# Patient Record
Sex: FEMALE | Race: ASIAN | NOT HISPANIC OR LATINO
[De-identification: names, ages, dates, MRNs, and addresses within clinical notes are randomized per-mention and may not be internally consistent; named-entity substitution may affect disease eponyms.]

---

## 2023-08-01 PROBLEM — Z00.00 ENCOUNTER FOR PREVENTIVE HEALTH EXAMINATION: Status: ACTIVE | Noted: 2023-08-01

## 2023-08-07 ENCOUNTER — NON-APPOINTMENT (OUTPATIENT)
Age: 43
End: 2023-08-07

## 2023-08-07 ENCOUNTER — APPOINTMENT (OUTPATIENT)
Dept: SURGICAL ONCOLOGY | Facility: CLINIC | Age: 43
End: 2023-08-07
Payer: MEDICAID

## 2023-08-07 VITALS
HEIGHT: 67 IN | RESPIRATION RATE: 16 BRPM | DIASTOLIC BLOOD PRESSURE: 84 MMHG | WEIGHT: 215 LBS | OXYGEN SATURATION: 98 % | SYSTOLIC BLOOD PRESSURE: 123 MMHG | HEART RATE: 77 BPM | BODY MASS INDEX: 33.74 KG/M2

## 2023-08-07 DIAGNOSIS — Z78.9 OTHER SPECIFIED HEALTH STATUS: ICD-10-CM

## 2023-08-07 PROCEDURE — 99245 OFF/OP CONSLTJ NEW/EST HI 55: CPT

## 2023-08-07 NOTE — ASSESSMENT
[FreeTextEntry1] : I discussed with the patient that papillary breast lesions were previously always recommended to allow histologic evaluation of the entire lesion. Prior studies estimated a 5 to 8% incidence of upgrading of these lesions, most commonly to either atypical hyperplasia or DCIS. However, more recent studies looking at pure IDP without atypia have shown lower rates of upgrade of 1.7%. Therefore, the decision to excise should be based on size, symptoms, or breast cancer risk factors.   Preoperative, intraoperative, and postoperative considerations including preoperative localization by Radiology of this nonpalpable mass, intraoperative anesthetic management, and what she can expect in postoperative period were reviewed.   Risks, benefits, and alternatives to proposed surgical procedure were reviewed and all questions were answered to her satisfaction.  Plan: - Left breast excisional biopsy with MagSeed localization (1 site)

## 2023-08-07 NOTE — CONSULT LETTER
[Dear  ___] : Dear  [unfilled], [Consult Letter:] : I had the pleasure of evaluating your patient, [unfilled]. [Please see my note below.] : Please see my note below. [Sincerely,] : Sincerely, [FreeTextEntry3] : Huong Morocho MD Breast Surgeon Division of Surgical Oncology Department of Surgery [address] [place] [address]  Tel: [phone] Fax: [phone] Email: [email or web address]

## 2023-08-07 NOTE — DATA REVIEWED
[FreeTextEntry1] : 7/27/22 SM  9/12/2022 B/L US 6/5/2023 L US 6/15/23 B/L DM 6/15/23 L USG-CNB 8/4/2023 L DM

## 2023-08-07 NOTE — PHYSICAL EXAM
[Normocephalic] : normocephalic [Atraumatic] : atraumatic [EOMI] : extra ocular movement intact [PERRL] : pupils equal, round and reactive to light [Sclera nonicteric] : sclera nonicteric [Supple] : supple [No Supraclavicular Adenopathy] : no supraclavicular adenopathy [Examined in the supine and seated position] : examined in the supine and seated position [Bra Size: ___] : Bra Size: [unfilled] [Grade 1] : Ptosis Grade 1 [No dominant masses] : no dominant masses in right breast  [No dominant masses] : no dominant masses left breast [No Nipple Retraction] : no left nipple retraction [No Nipple Discharge] : no left nipple discharge [Breast Mass Right Breast ___cm] : no masses [Breast Mass Left Breast ___cm] : no masses [Breast Nipple Inversion] : nipples not inverted [Breast Nipple Retraction] : nipples not retracted [Breast Nipple Flattening] : nipples not flattened [Breast Nipple Fissures] : nipples not fissured [Breast Abnormal Lactation (Galactorrhea)] : no galactorrhea [Breast Abnormal Secretion Bloody Fluid] : no bloody discharge [Breast Abnormal Secretion Serous Fluid] : no serous discharge [Breast Abnormal Secretion Opalescent Fluid] : no milky discharge [No Axillary Lymphadenopathy] : no left axillary lymphadenopathy [No Edema] : no edema [No Rashes] : no rashes [No Ulceration] : no ulceration [de-identified] : non-labored respirations

## 2023-08-07 NOTE — HISTORY OF PRESENT ILLNESS
[FreeTextEntry1] : 42 year-old female presents for an initial consultation, referred by Dr. Meri Pruitt for evaluation of for evaluation of newly diagnosed left breast intraductal papilloma.  She is accompanied by her daughters Byron and Viri.  The patient reports that she started mammograms last year.  She was found to have a probably benign appearing mass recommended for short-term follow-up.  She denies any breast complaints.  Denies any masses, skin changes, or nipple discharge.  Most recent imagin22 SM (R) revealed heterogeneously dense breasts -Negative -BR2  2022 B/L US ([facility])- for supplemental screening purposes -R: neg -L: 9:00 N1 hypoechoic mass, 7 mm, probably benign--> f/u L US in 6 months -BR3  2023 L US (R) -9:00 N1 1.1 cm hypoechoic mass increased in size from prior--> recc USG-CNB -BR4  6/15/23 B/L DM (R) revealed heterogeneously dense breasts -R: neg -L: clustered microcalcifications 9:00 anterior-->rec left breast spot magnification views.  Pt scheduled for L USG-CNB -BR4  6/15/23 L USG-CNB (R) -L 9:00 N1 (cork): Intraductal papilloma.  Calcifications are present in breast tissue ***Cluster of microcalcifications seen on pre-biopsy mammogram which may correlate with the biopsy site, however, postbiopsy mammogram could not be obtained as machine was down for maintenance.  Diagnostic mammogram should be performed prior to surgical procedure.  2023 L DM ([facility]) heterogeneously dense breasts   - Subtle loose cluster of primarily spherical probably benign calcifications at the area of concern anterior third portion of the breast at 9:00 -> f/u DM 6 months  - BR3  PMH: Hyperlipidemia PSH: Hysterectomy for fibroids  Meds: Denies ALL: Denies SH: No tobacco.  No EtOH FH: No family history of breast cancer or other cancers GYN: Menarche 14.  Surgical menopause 40.  .  Age of first full-term pregnancy 26.  Breast-feeding x1.5 years.  OCP none fertility none HRT none

## 2023-08-07 NOTE — PAST MEDICAL HISTORY
[Surgical Menopause] : The patient is in surgical menopause [Menarche Age ____] : age at menarche was [unfilled] [Menopause Age____] : age at menopause was [unfilled] [History of Hormone Replacement Treatment] : has no history of hormone replacement treatment [Total Preg ___] : G[unfilled] [Live Births ___] : P[unfilled]  [Age At Live Birth ___] : Age at live birth: [unfilled] [FreeTextEntry6] : none [FreeTextEntry7] : none [FreeTextEntry8] : 1.5 years

## 2023-08-16 ENCOUNTER — OUTPATIENT (OUTPATIENT)
Dept: OUTPATIENT SERVICES | Facility: HOSPITAL | Age: 43
LOS: 1 days | End: 2023-08-16

## 2023-08-16 VITALS
HEIGHT: 67 IN | HEART RATE: 82 BPM | WEIGHT: 216.05 LBS | OXYGEN SATURATION: 97 % | TEMPERATURE: 99 F | RESPIRATION RATE: 14 BRPM | SYSTOLIC BLOOD PRESSURE: 114 MMHG | DIASTOLIC BLOOD PRESSURE: 83 MMHG

## 2023-08-16 DIAGNOSIS — Z90.710 ACQUIRED ABSENCE OF BOTH CERVIX AND UTERUS: Chronic | ICD-10-CM

## 2023-08-16 DIAGNOSIS — D24.2 BENIGN NEOPLASM OF LEFT BREAST: ICD-10-CM

## 2023-08-16 LAB
ALBUMIN SERPL ELPH-MCNC: 4.3 G/DL — SIGNIFICANT CHANGE UP (ref 3.3–5)
ALP SERPL-CCNC: 69 U/L — SIGNIFICANT CHANGE UP (ref 40–120)
ALT FLD-CCNC: 16 U/L — SIGNIFICANT CHANGE UP (ref 4–33)
ANION GAP SERPL CALC-SCNC: 12 MMOL/L — SIGNIFICANT CHANGE UP (ref 7–14)
AST SERPL-CCNC: 14 U/L — SIGNIFICANT CHANGE UP (ref 4–32)
BILIRUB SERPL-MCNC: 0.3 MG/DL — SIGNIFICANT CHANGE UP (ref 0.2–1.2)
BUN SERPL-MCNC: 11 MG/DL — SIGNIFICANT CHANGE UP (ref 7–23)
CALCIUM SERPL-MCNC: 9.5 MG/DL — SIGNIFICANT CHANGE UP (ref 8.4–10.5)
CHLORIDE SERPL-SCNC: 101 MMOL/L — SIGNIFICANT CHANGE UP (ref 98–107)
CO2 SERPL-SCNC: 27 MMOL/L — SIGNIFICANT CHANGE UP (ref 22–31)
CREAT SERPL-MCNC: 0.8 MG/DL — SIGNIFICANT CHANGE UP (ref 0.5–1.3)
EGFR: 94 ML/MIN/1.73M2 — SIGNIFICANT CHANGE UP
GLUCOSE SERPL-MCNC: 80 MG/DL — SIGNIFICANT CHANGE UP (ref 70–99)
HCT VFR BLD CALC: 42.8 % — SIGNIFICANT CHANGE UP (ref 34.5–45)
HGB BLD-MCNC: 13.9 G/DL — SIGNIFICANT CHANGE UP (ref 11.5–15.5)
MCHC RBC-ENTMCNC: 28.1 PG — SIGNIFICANT CHANGE UP (ref 27–34)
MCHC RBC-ENTMCNC: 32.5 GM/DL — SIGNIFICANT CHANGE UP (ref 32–36)
MCV RBC AUTO: 86.5 FL — SIGNIFICANT CHANGE UP (ref 80–100)
NRBC # BLD: 0 /100 WBCS — SIGNIFICANT CHANGE UP (ref 0–0)
NRBC # FLD: 0 K/UL — SIGNIFICANT CHANGE UP (ref 0–0)
PLATELET # BLD AUTO: 299 K/UL — SIGNIFICANT CHANGE UP (ref 150–400)
POTASSIUM SERPL-MCNC: 3.9 MMOL/L — SIGNIFICANT CHANGE UP (ref 3.5–5.3)
POTASSIUM SERPL-SCNC: 3.9 MMOL/L — SIGNIFICANT CHANGE UP (ref 3.5–5.3)
PROT SERPL-MCNC: 7.9 G/DL — SIGNIFICANT CHANGE UP (ref 6–8.3)
RBC # BLD: 4.95 M/UL — SIGNIFICANT CHANGE UP (ref 3.8–5.2)
RBC # FLD: 14 % — SIGNIFICANT CHANGE UP (ref 10.3–14.5)
SODIUM SERPL-SCNC: 140 MMOL/L — SIGNIFICANT CHANGE UP (ref 135–145)
WBC # BLD: 9.24 K/UL — SIGNIFICANT CHANGE UP (ref 3.8–10.5)
WBC # FLD AUTO: 9.24 K/UL — SIGNIFICANT CHANGE UP (ref 3.8–10.5)

## 2023-08-16 NOTE — H&P PST ADULT - GASTROINTESTINAL
negative normal/soft/nontender/nondistended/normal active bowel sounds/no guarding/no rigidity/no organomegaly/no palpable kang/no masses palpable

## 2023-08-16 NOTE — H&P PST ADULT - PROBLEM SELECTOR PLAN 1
Pt scheduled for left breast excisional biopsy with magseed localization on 8/25/2023.  labs done results  pending, Chlorhexidine provided-  written and verbal instructions given, with teach back, pt able to verbalize understanding.   medication day of procedure-  pepcid

## 2023-08-16 NOTE — H&P PST ADULT - HISTORY OF PRESENT ILLNESS
41y/o female scheduled for left breast excisional biopsy with magseed localized  on 8/25/2023.  Pt states, "recent breast US abnormal,  bx left breast intraductal papilloma."

## 2023-08-16 NOTE — H&P PST ADULT - NSICDXPASTMEDICALHX_GEN_ALL_CORE_FT
PAST MEDICAL HISTORY:  Benign neoplasm of left breast      PAST MEDICAL HISTORY:  Benign neoplasm of left breast     Leiomyoma

## 2023-08-18 PROBLEM — D21.9 BENIGN NEOPLASM OF CONNECTIVE AND OTHER SOFT TISSUE, UNSPECIFIED: Chronic | Status: ACTIVE | Noted: 2023-08-16

## 2023-08-18 PROBLEM — D24.2 BENIGN NEOPLASM OF LEFT BREAST: Chronic | Status: ACTIVE | Noted: 2023-08-16

## 2023-08-23 ENCOUNTER — APPOINTMENT (OUTPATIENT)
Dept: MAMMOGRAPHY | Facility: IMAGING CENTER | Age: 43
End: 2023-08-23
Payer: MEDICAID

## 2023-08-23 ENCOUNTER — OUTPATIENT (OUTPATIENT)
Dept: OUTPATIENT SERVICES | Facility: HOSPITAL | Age: 43
LOS: 1 days | End: 2023-08-23
Payer: MEDICAID

## 2023-08-23 DIAGNOSIS — D24.2 BENIGN NEOPLASM OF LEFT BREAST: ICD-10-CM

## 2023-08-23 DIAGNOSIS — Z90.710 ACQUIRED ABSENCE OF BOTH CERVIX AND UTERUS: Chronic | ICD-10-CM

## 2023-08-23 PROCEDURE — 19285 PERQ DEV BREAST 1ST US IMAG: CPT | Mod: LT

## 2023-08-23 PROCEDURE — A4648: CPT

## 2023-08-23 PROCEDURE — 19285 PERQ DEV BREAST 1ST US IMAG: CPT

## 2023-08-24 ENCOUNTER — TRANSCRIPTION ENCOUNTER (OUTPATIENT)
Age: 43
End: 2023-08-24

## 2023-08-25 ENCOUNTER — OUTPATIENT (OUTPATIENT)
Dept: OUTPATIENT SERVICES | Facility: HOSPITAL | Age: 43
LOS: 1 days | Discharge: HOME | End: 2023-08-25
Payer: MEDICAID

## 2023-08-25 ENCOUNTER — RESULT REVIEW (OUTPATIENT)
Age: 43
End: 2023-08-25

## 2023-08-25 ENCOUNTER — APPOINTMENT (OUTPATIENT)
Dept: MAMMOGRAPHY | Facility: IMAGING CENTER | Age: 43
End: 2023-08-25
Payer: MEDICAID

## 2023-08-25 ENCOUNTER — TRANSCRIPTION ENCOUNTER (OUTPATIENT)
Age: 43
End: 2023-08-25

## 2023-08-25 ENCOUNTER — OUTPATIENT (OUTPATIENT)
Dept: OUTPATIENT SERVICES | Facility: HOSPITAL | Age: 43
LOS: 1 days | End: 2023-08-25
Payer: MEDICAID

## 2023-08-25 ENCOUNTER — APPOINTMENT (OUTPATIENT)
Dept: SURGICAL ONCOLOGY | Facility: AMBULATORY SURGERY CENTER | Age: 43
End: 2023-08-25

## 2023-08-25 VITALS
OXYGEN SATURATION: 98 % | WEIGHT: 216.05 LBS | TEMPERATURE: 98 F | HEIGHT: 67 IN | HEART RATE: 89 BPM | RESPIRATION RATE: 18 BRPM | SYSTOLIC BLOOD PRESSURE: 139 MMHG | DIASTOLIC BLOOD PRESSURE: 87 MMHG

## 2023-08-25 VITALS
DIASTOLIC BLOOD PRESSURE: 74 MMHG | HEART RATE: 86 BPM | OXYGEN SATURATION: 99 % | SYSTOLIC BLOOD PRESSURE: 130 MMHG | TEMPERATURE: 98 F | RESPIRATION RATE: 18 BRPM

## 2023-08-25 DIAGNOSIS — Z90.710 ACQUIRED ABSENCE OF BOTH CERVIX AND UTERUS: Chronic | ICD-10-CM

## 2023-08-25 DIAGNOSIS — Z00.8 ENCOUNTER FOR OTHER GENERAL EXAMINATION: ICD-10-CM

## 2023-08-25 DIAGNOSIS — D24.2 BENIGN NEOPLASM OF LEFT BREAST: ICD-10-CM

## 2023-08-25 PROCEDURE — 19125 EXCISION BREAST LESION: CPT | Mod: LT

## 2023-08-25 PROCEDURE — 88307 TISSUE EXAM BY PATHOLOGIST: CPT | Mod: 26

## 2023-08-25 PROCEDURE — 76098 X-RAY EXAM SURGICAL SPECIMEN: CPT | Mod: 26

## 2023-08-25 PROCEDURE — 76098 X-RAY EXAM SURGICAL SPECIMEN: CPT

## 2023-08-25 NOTE — ASU DISCHARGE PLAN (ADULT/PEDIATRIC) - PAIN MANAGEMENT
over the counter tylenol as needed for pain , next dose can be taken at 11pm , alternate with motrin 600mg every 6 hours as needed for pain

## 2023-08-25 NOTE — ASU PREOPERATIVE ASSESSMENT, ADULT ([facility]ARK ONLY) - FALL HARM RISK - UNIVERSAL INTERVENTIONS
Bed in lowest position, wheels locked, appropriate side rails in place/Call bell, personal items and telephone in reach/Instruct patient to call for assistance before getting out of bed or chair/Non-slip footwear when patient is out of bed/[place] to call system/Physically safe environment - no spills, clutter or unnecessary equipment/Purposeful Proactive Rounding/Room/bathroom lighting operational, light cord in reach

## 2023-08-25 NOTE — ASU DISCHARGE PLAN (ADULT/PEDIATRIC) - PATIENT EDUCATION MATERIALS PROVIED
Dr Morocho instruction sheet given please follow all instructions/Provider pre-printed instructions given

## 2023-08-25 NOTE — ASU DISCHARGE PLAN (ADULT/PEDIATRIC) - NS MD DC FALL RISK RISK
For information on Fall & Injury Prevention, visit: [email or web address] OR  [email or web address] OR  https://www.cdc.gov/steadi/patient.html

## 2023-08-25 NOTE — ASU DISCHARGE PLAN (ADULT/PEDIATRIC) - ASU DC SPECIAL INSTRUCTIONSFT
Huong Morocho MD  Surgical Oncology  [address] [place], [place] [id number]  Tel [phone]  Direct [phone]  Fax [phone]    BREAST AND AXILLA SURGERY POST-OP INSTRUCTIONS    1) Medications/pain management  • Restart all of your regular medications unless specifically told otherwise  • It is easier to control pain by taking medications before the pain becomes severe  • We recommend taking the following medications on a regular schedule:  • Tylenol 325mg 1-3 tabs every 6 hours for the first day and/or  • Ibuprofen 200mg 1-3 tabs every 6 hours (with food)    • Ice packs may also help to reduce pain and swelling. They can be used every 3-4 hours for ten minutes  at a time. Warm compresses are not to be used near the incision.    2) Incision and Dressing Care  • Wear your sports bra for the next 3-4 days, it will help minimize postoperative bleeding and swelling. You should even wear the bra to bed at night.  • The outer clear dressing and telfa may be removed in 2 days; you can shower today before removing because the dressing is waterproof, just be sure to pat it dry.  • Under the dressing, your incision has both sutures, steri-strips (small, white, strips of tape), and telfa (a white thin foam like material).  • The sutures will be absorbed by your skin tissue over time.  • Steri-strips will fall off by itself, or will be removed in the office.   • When showering, you may use soap and water. Do not scrub. Do not soak in a bath. Pat dry.  • Note: if you received blue dye for lymph node surgery, you may have blue/green urine. This is normal  and will clear by itself.    3) Diet: Resume your regular diet as tolerated.    4) Activity  • Avoid strenuous activity, heavy lifting (> 15 pounds) and vigorous exercise until seen in follow-up.  • Walking and most daily activity may be resumed the day after surgery.  • Most people return to desk-type work in 1-2 days. This will depend on how your feel and what type of  work you do.    5) Follow-up Care  • Pathology results from your biopsy are usually available within 1 week  • You will receive your results by phone or at your follow-up visit  • If your follow-up appointment was not made prior to your surgery, please call the office to schedule an  appointment for 1-2 weeks after your surgery.    Call the office if:  • Pain is not relieved by medication  • Fever greater than 100F or chills  • Persistent bleeding or drainage from your incision  • Persistent swelling, or increasing redness of around the incision  • Any other questions or concerns

## 2023-09-06 ENCOUNTER — APPOINTMENT (OUTPATIENT)
Dept: SURGICAL ONCOLOGY | Facility: CLINIC | Age: 43
End: 2023-09-06
Payer: MEDICAID

## 2023-09-06 VITALS
OXYGEN SATURATION: 99 % | BODY MASS INDEX: 34.21 KG/M2 | WEIGHT: 218 LBS | DIASTOLIC BLOOD PRESSURE: 85 MMHG | HEIGHT: 67 IN | HEART RATE: 84 BPM | SYSTOLIC BLOOD PRESSURE: 130 MMHG | RESPIRATION RATE: 17 BRPM

## 2023-09-06 PROCEDURE — 99024 POSTOP FOLLOW-UP VISIT: CPT

## 2023-09-06 NOTE — HISTORY OF PRESENT ILLNESS
[FreeTextEntry1] : 42 year-old female presents for an initial consultation, referred by Dr. Meri Pruitt for evaluation of for evaluation of newly diagnosed left breast intraductal papilloma s/p L breast excisional biopsy 2023 here for postop visit  She is not accompanied by her daughters Byron and Viri today.  Previous history: The patient reports that she started mammograms last year.  She was found to have a probably benign appearing mass recommended for short-term follow-up.  She denies any breast complaints.  Denies any masses, skin changes, or nipple discharge.  Most recent imagin22 SM (R) revealed heterogeneously dense breasts -Negative -BR2  2022 B/L US (R)- for supplemental screening purposes -R: neg -L: 9:00 N1 hypoechoic mass, 7 mm, probably benign--> f/u L US in 6 months -BR3  2023 L US (R) -9:00 N1 1.1 cm hypoechoic mass increased in size from prior--> recc USG-CNB -BR4  6/15/23 B/L DM (R) revealed heterogeneously dense breasts -R: neg -L: clustered microcalcifications 9:00 anterior-->rec left breast spot magnification views.  Pt scheduled for L USG-CNB -BR4  6/15/23 L USG-CNB (R) -L 9:00 N1 (cork): Intraductal papilloma.  Calcifications are present in breast tissue ***Cluster of microcalcifications seen on pre-biopsy mammogram which may correlate with the biopsy site, however, postbiopsy mammogram could not be obtained as machine was down for maintenance.  Diagnostic mammogram should be performed prior to surgical procedure.  2023 L DM ([facility]) heterogeneously dense breasts   - Subtle loose cluster of primarily spherical probably benign calcifications at the area of concern anterior third portion of the breast at 9:00 -> f/u DM 6 months  - BR3  PMH: Hyperlipidemia PSH: Hysterectomy for fibroids  Meds: Denies ALL: Denies SH: No tobacco.  No EtOH FH: No family history of breast cancer or other cancers GYN: Menarche 14.  Surgical menopause 40.  .  Age of first full-term pregnancy 26.  Breast-feeding x1.5 years.  OCP none fertility none HRT none  2023 L breast excisional biopsy   - PATH PENDING  Interval history: The patient reports some occasional sharp pain in the left breast along with itching. She takes Tylenol once daily as needed. She continues to wear a sports bra which is more comfortable than her regular bra.

## 2023-09-06 NOTE — CONSULT LETTER
[Dear  ___] : Dear  [unfilled], [Courtesy Letter:] : I had the pleasure of seeing your patient, [unfilled], in my office today. [Please see my note below.] : Please see my note below. [Consult Closing:] : Thank you very much for allowing me to participate in the care of this patient.  If you have any questions, please do not hesitate to contact me. [Sincerely,] : Sincerely, [FreeTextEntry3] : Huong Morocho MD Breast Surgeon Division of Surgical Oncology Department of Surgery [address] [place] [address]  Tel: [phone] Fax: [phone] Email: [email or web address]

## 2023-09-06 NOTE — ASSESSMENT
[FreeTextEntry1] : 42 year-old female presents for an initial consultation, referred by Dr. Meri Pruitt for evaluation of for evaluation of newly diagnosed left breast intraductal papilloma s/p L breast excisional biopsy 9/6/2023 here for postop visit  9/6/2023 L breast excisional biopsy   - PATH PENDING  Exam today shows a well-healing incision without evidence of infection, hematoma, or seroma.  Plan:  - f/u path  - 2/2024 L DM  - RTO after imaging or PRN

## 2023-09-06 NOTE — PHYSICAL EXAM
[Normocephalic] : normocephalic [Atraumatic] : atraumatic [EOMI] : extra ocular movement intact [PERRL] : pupils equal, round and reactive to light [Sclera nonicteric] : sclera nonicteric [Bra Size: ___] : Bra Size: [unfilled] [Grade 1] : Ptosis Grade 1 [No Edema] : no edema [No Rashes] : no rashes [No Ulceration] : no ulceration [de-identified] : non-labored respirations  [de-identified] : medial circumareolar incision c/d/i, no erythema, no seroma

## 2023-09-07 LAB — SURGICAL PATHOLOGY STUDY: SIGNIFICANT CHANGE UP

## 2024-07-01 ENCOUNTER — APPOINTMENT (OUTPATIENT)
Dept: SURGICAL ONCOLOGY | Facility: CLINIC | Age: 44
End: 2024-07-01
Payer: MEDICAID

## 2024-07-01 VITALS
HEART RATE: 96 BPM | HEIGHT: 67 IN | SYSTOLIC BLOOD PRESSURE: 120 MMHG | BODY MASS INDEX: 33.9 KG/M2 | DIASTOLIC BLOOD PRESSURE: 80 MMHG | OXYGEN SATURATION: 98 % | WEIGHT: 216 LBS

## 2024-07-01 DIAGNOSIS — D24.2 BENIGN NEOPLASM OF LEFT BREAST: ICD-10-CM

## 2024-07-01 DIAGNOSIS — R92.1 MAMMOGRAPHIC CALCIFICATION FOUND ON DIAGNOSTIC IMAGING OF BREAST: ICD-10-CM

## 2024-07-01 PROCEDURE — 99214 OFFICE O/P EST MOD 30 MIN: CPT

## 2024-08-10 ENCOUNTER — EMERGENCY (EMERGENCY)
Facility: HOSPITAL | Age: 44
LOS: 0 days | Discharge: HOME | End: 2024-08-10
Attending: STUDENT IN AN ORGANIZED HEALTH CARE EDUCATION/TRAINING PROGRAM
Payer: COMMERCIAL

## 2024-08-10 VITALS
HEART RATE: 71 BPM | TEMPERATURE: 98 F | RESPIRATION RATE: 18 BRPM | SYSTOLIC BLOOD PRESSURE: 110 MMHG | DIASTOLIC BLOOD PRESSURE: 70 MMHG | OXYGEN SATURATION: 99 %

## 2024-08-10 VITALS
DIASTOLIC BLOOD PRESSURE: 87 MMHG | SYSTOLIC BLOOD PRESSURE: 140 MMHG | OXYGEN SATURATION: 99 % | WEIGHT: 198.42 LBS | HEART RATE: 98 BPM | TEMPERATURE: 98 F | RESPIRATION RATE: 20 BRPM
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
## 2024-08-10 DIAGNOSIS — T21.22XA BURN OF SECOND DEGREE OF ABDOMINAL WALL, INITIAL ENCOUNTER: ICD-10-CM

## 2024-08-10 DIAGNOSIS — T31.0 BURNS INVOLVING LESS THAN 10% OF BODY SURFACE: ICD-10-CM

## 2024-08-10 DIAGNOSIS — Z90.710 ACQUIRED ABSENCE OF BOTH CERVIX AND UTERUS: Chronic | ICD-10-CM
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
## 2024-08-10 PROCEDURE — 99284 EMERGENCY DEPT VISIT MOD MDM: CPT

## 2024-12-30 PROBLEM — D24.2 FIBROADENOMA OF LEFT BREAST: Status: ACTIVE | Noted: 2024-12-30

## 2025-01-06 ENCOUNTER — APPOINTMENT (OUTPATIENT)
Dept: SURGICAL ONCOLOGY | Facility: CLINIC | Age: 45
End: 2025-01-06

## 2025-01-06 DIAGNOSIS — D24.2 BENIGN NEOPLASM OF LEFT BREAST: ICD-10-CM

## 2025-01-06 DIAGNOSIS — R92.1 MAMMOGRAPHIC CALCIFICATION FOUND ON DIAGNOSTIC IMAGING OF BREAST: ICD-10-CM

## (undated) DEVICE — ELCTR BOVIE TIP BLADE INSULATED 4" EDGE

## (undated) DEVICE — RADIOGRAPHY DVC SPEC TRANSPEC

## (undated) DEVICE — GLV 6 PROTEXIS (WHITE)

## (undated) DEVICE — GLV 6.5 PROTEXIS (WHITE)

## (undated) DEVICE — DRSG MAMMARY SUPPORT LG SIZE 4

## (undated) DEVICE — GOWN LG

## (undated) DEVICE — DRAPE LAPAROTOMY TRANSVERSE

## (undated) DEVICE — VENODYNE/SCD SLEEVE CALF MEDIUM

## (undated) DEVICE — DRAPE INSTRUMENT POUCH 6.75" X 11"

## (undated) DEVICE — DRSG TELFA 3 X 8

## (undated) DEVICE — DRSG MAMMARY SUPPORT XL SIZE 5

## (undated) DEVICE — DRAPE 3/4 SHEET 52X76"

## (undated) DEVICE — SOL IRR POUR NS 0.9% 500ML

## (undated) DEVICE — POSITIONER PATIENT SAFETY STRAP 3X60"

## (undated) DEVICE — SOL IRR POUR H2O 500ML

## (undated) DEVICE — POSITIONER FOAM EGG CRATE ULNAR 2PCS (PINK)

## (undated) DEVICE — DRSG MAMMARY SUPPORT SM SIZE 1

## (undated) DEVICE — DRSG MAMMARY SUPPORT MED SIZE 2

## (undated) DEVICE — WARMING BLANKET LOWER ADULT

## (undated) DEVICE — SUT SILK 2-0 30" SH

## (undated) DEVICE — NDL HYPO SAFE 25G X 1" (ORANGE)

## (undated) DEVICE — DRSG TEGADERM 4X4.75"

## (undated) DEVICE — PACK MINOR NO DRAPE

## (undated) DEVICE — ELCTR GROUNDING PAD ADULT COVIDIEN

## (undated) DEVICE — ELCTR ROCKER SWITCH PENCIL BLUE 10FT

## (undated) DEVICE — SUT VICRYL 3-0 27" SH UNDYED

## (undated) DEVICE — DRSG STERISTRIPS 0.5 X 4"

## (undated) DEVICE — SUT MONOCRYL 4-0 27" PS-2 UNDYED

## (undated) DEVICE — DRAPE TOWEL BLUE 17" X 24"

## (undated) DEVICE — LAP PAD W RING 18 X 18"

## (undated) DEVICE — DRSG MAMMARY SUPPORT MED SIZE 3